# Patient Record
Sex: FEMALE
[De-identification: names, ages, dates, MRNs, and addresses within clinical notes are randomized per-mention and may not be internally consistent; named-entity substitution may affect disease eponyms.]

---

## 2018-04-02 ENCOUNTER — HOSPITAL ENCOUNTER (EMERGENCY)
Dept: HOSPITAL 92 - ERS | Age: 33
Discharge: LEFT BEFORE DISCHARGE | End: 2018-04-02
Payer: SELF-PAY

## 2018-04-02 DIAGNOSIS — Z53.21: Primary | ICD-10-CM

## 2018-04-02 LAB
ALBUMIN SERPL BCG-MCNC: 5 G/DL (ref 3.5–5)
ALP SERPL-CCNC: 61 U/L (ref 40–150)
ALT SERPL W P-5'-P-CCNC: 27 U/L (ref 8–55)
ANION GAP SERPL CALC-SCNC: 17 MMOL/L (ref 10–20)
AST SERPL-CCNC: 26 U/L (ref 5–34)
BASOPHILS # BLD AUTO: 0 THOU/UL (ref 0–0.2)
BASOPHILS NFR BLD AUTO: 0.2 % (ref 0–1)
BILIRUB SERPL-MCNC: 1 MG/DL (ref 0.2–1.2)
BUN SERPL-MCNC: 9 MG/DL (ref 7–18.7)
CALCIUM SERPL-MCNC: 10.3 MG/DL (ref 7.8–10.44)
CHLORIDE SERPL-SCNC: 104 MMOL/L (ref 98–107)
CO2 SERPL-SCNC: 21 MMOL/L (ref 22–29)
CREAT CL PREDICTED SERPL C-G-VRATE: 0 ML/MIN (ref 70–130)
EOSINOPHIL # BLD AUTO: 0 THOU/UL (ref 0–0.7)
EOSINOPHIL NFR BLD AUTO: 0.1 % (ref 0–10)
GLOBULIN SER CALC-MCNC: 3.3 G/DL (ref 2.4–3.5)
GLUCOSE SERPL-MCNC: 124 MG/DL (ref 70–105)
HGB BLD-MCNC: 13.9 G/DL (ref 12–16)
LIPASE SERPL-CCNC: 5 U/L (ref 8–78)
LYMPHOCYTES # BLD: 1.1 THOU/UL (ref 1.2–3.4)
LYMPHOCYTES NFR BLD AUTO: 15.5 % (ref 21–51)
MCH RBC QN AUTO: 30.9 PG (ref 27–31)
MCV RBC AUTO: 95.8 FL (ref 81–99)
MONOCYTES # BLD AUTO: 0.5 THOU/UL (ref 0.11–0.59)
MONOCYTES NFR BLD AUTO: 6.6 % (ref 0–10)
NEUTROPHILS # BLD AUTO: 5.4 THOU/UL (ref 1.4–6.5)
NEUTROPHILS NFR BLD AUTO: 77.6 % (ref 42–75)
PLATELET # BLD AUTO: 293 THOU/UL (ref 130–400)
POTASSIUM SERPL-SCNC: 3.7 MMOL/L (ref 3.5–5.1)
PREGS CONTROL BACKGROUND?: (no result)
PREGS CONTROL BAR APPEAR?: YES
RBC # BLD AUTO: 4.49 MILL/UL (ref 4.2–5.4)
SODIUM SERPL-SCNC: 138 MMOL/L (ref 136–145)
WBC # BLD AUTO: 6.9 THOU/UL (ref 4.8–10.8)

## 2018-04-02 PROCEDURE — 85025 COMPLETE CBC W/AUTO DIFF WBC: CPT

## 2018-04-02 PROCEDURE — 84703 CHORIONIC GONADOTROPIN ASSAY: CPT

## 2018-04-02 PROCEDURE — 36415 COLL VENOUS BLD VENIPUNCTURE: CPT

## 2018-04-02 PROCEDURE — 80053 COMPREHEN METABOLIC PANEL: CPT

## 2018-04-02 PROCEDURE — 83690 ASSAY OF LIPASE: CPT

## 2018-04-09 ENCOUNTER — HOSPITAL ENCOUNTER (EMERGENCY)
Dept: HOSPITAL 92 - ERS | Age: 33
Discharge: HOME | End: 2018-04-09
Payer: SELF-PAY

## 2018-04-09 DIAGNOSIS — R07.89: ICD-10-CM

## 2018-04-09 DIAGNOSIS — S29.011A: Primary | ICD-10-CM

## 2018-04-09 DIAGNOSIS — F41.9: ICD-10-CM

## 2018-04-09 LAB
ALBUMIN SERPL BCG-MCNC: 4.8 G/DL (ref 3.5–5)
ALP SERPL-CCNC: 51 U/L (ref 40–150)
ALT SERPL W P-5'-P-CCNC: 18 U/L (ref 8–55)
ANION GAP SERPL CALC-SCNC: 20 MMOL/L (ref 10–20)
AST SERPL-CCNC: 21 U/L (ref 5–34)
BASOPHILS # BLD AUTO: 0 THOU/UL (ref 0–0.2)
BASOPHILS NFR BLD AUTO: 0.4 % (ref 0–1)
BILIRUB SERPL-MCNC: 1 MG/DL (ref 0.2–1.2)
BUN SERPL-MCNC: 10 MG/DL (ref 7–18.7)
CALCIUM SERPL-MCNC: 9.7 MG/DL (ref 7.8–10.44)
CHLORIDE SERPL-SCNC: 101 MMOL/L (ref 98–107)
CK MB SERPL-MCNC: 1.4 NG/ML (ref 0–6.6)
CK SERPL-CCNC: 121 U/L (ref 29–168)
CO2 SERPL-SCNC: 19 MMOL/L (ref 22–29)
CREAT CL PREDICTED SERPL C-G-VRATE: 0 ML/MIN (ref 70–130)
EOSINOPHIL # BLD AUTO: 0 THOU/UL (ref 0–0.7)
EOSINOPHIL NFR BLD AUTO: 0.3 % (ref 0–10)
GLOBULIN SER CALC-MCNC: 3 G/DL (ref 2.4–3.5)
GLUCOSE SERPL-MCNC: 130 MG/DL (ref 70–105)
HGB BLD-MCNC: 14.6 G/DL (ref 12–16)
LYMPHOCYTES # BLD: 1.9 THOU/UL (ref 1.2–3.4)
LYMPHOCYTES NFR BLD AUTO: 18.1 % (ref 21–51)
MCH RBC QN AUTO: 31.6 PG (ref 27–31)
MCV RBC AUTO: 91.9 FL (ref 81–99)
MONOCYTES # BLD AUTO: 0.7 THOU/UL (ref 0.11–0.59)
MONOCYTES NFR BLD AUTO: 6.7 % (ref 0–10)
NEUTROPHILS # BLD AUTO: 7.8 THOU/UL (ref 1.4–6.5)
NEUTROPHILS NFR BLD AUTO: 74.5 % (ref 42–75)
PLATELET # BLD AUTO: 300 THOU/UL (ref 130–400)
POTASSIUM SERPL-SCNC: 3 MMOL/L (ref 3.5–5.1)
PREGS CONTROL BACKGROUND?: (no result)
PREGS CONTROL BAR APPEAR?: YES
RBC # BLD AUTO: 4.62 MILL/UL (ref 4.2–5.4)
SODIUM SERPL-SCNC: 137 MMOL/L (ref 136–145)
TROPONIN I SERPL DL<=0.01 NG/ML-MCNC: (no result) NG/ML (ref ?–0.03)
WBC # BLD AUTO: 10.5 THOU/UL (ref 4.8–10.8)

## 2018-04-09 PROCEDURE — 96374 THER/PROPH/DIAG INJ IV PUSH: CPT

## 2018-04-09 PROCEDURE — 82550 ASSAY OF CK (CPK): CPT

## 2018-04-09 PROCEDURE — 80053 COMPREHEN METABOLIC PANEL: CPT

## 2018-04-09 PROCEDURE — 93005 ELECTROCARDIOGRAM TRACING: CPT

## 2018-04-09 PROCEDURE — 85025 COMPLETE CBC W/AUTO DIFF WBC: CPT

## 2018-04-09 PROCEDURE — 85379 FIBRIN DEGRADATION QUANT: CPT

## 2018-04-09 PROCEDURE — 84703 CHORIONIC GONADOTROPIN ASSAY: CPT

## 2018-04-09 PROCEDURE — 84484 ASSAY OF TROPONIN QUANT: CPT

## 2018-04-09 PROCEDURE — 71045 X-RAY EXAM CHEST 1 VIEW: CPT

## 2018-04-09 PROCEDURE — 82553 CREATINE MB FRACTION: CPT

## 2018-04-09 NOTE — RAD
SINGLE VIEW CHEST:

 

Date:  04/09/18 

 

COMPARISON:  

None. 

 

HISTORY:  

Chest pain. 

 

FINDINGS:

Single view of the chest shows a normal sized cardiomediastinal silhouette. There is no evidence of c
onsolidation, mass, or pleural effusion. The bones are unremarkable. 

 

IMPRESSION: 

No evidence of acute cardiopulmonary disease.  

 

POS: [facility]

## 2018-05-18 NOTE — EKG
Test Reason : 

Blood Pressure : ***/*** mmHG

Vent. Rate : 081 BPM     Atrial Rate : 081 BPM

   P-R Int : 124 ms          QRS Dur : 060 ms

    QT Int : 374 ms       P-R-T Axes : -05 060 031 degrees

   QTc Int : 434 ms

 

Normal sinus rhythm

Normal ECG

 

Confirmed by CARIN VARGAS D.O. ([id number]),  MANE LOVE ([id number]) on 5/18/2018 3:13:40 PM

 

Referred By:  ALICIA           Confirmed By:CARIN VARGAS D.O.

## 2018-10-07 ENCOUNTER — HOSPITAL ENCOUNTER (EMERGENCY)
Dept: HOSPITAL 92 - ERS | Age: 33
LOS: 1 days | Discharge: HOME | End: 2018-10-08
Payer: SELF-PAY

## 2018-10-07 DIAGNOSIS — F41.9: ICD-10-CM

## 2018-10-07 DIAGNOSIS — E86.0: Primary | ICD-10-CM

## 2018-10-07 DIAGNOSIS — K29.00: ICD-10-CM

## 2018-10-07 LAB
ALBUMIN SERPL BCG-MCNC: 5.3 G/DL (ref 3.5–5)
ALP SERPL-CCNC: 55 U/L (ref 40–150)
ALT SERPL W P-5'-P-CCNC: 20 U/L (ref 8–55)
ANION GAP SERPL CALC-SCNC: 21 MMOL/L (ref 10–20)
AST SERPL-CCNC: 20 U/L (ref 5–34)
BASOPHILS # BLD AUTO: 0.1 THOU/UL (ref 0–0.2)
BASOPHILS NFR BLD AUTO: 0.6 % (ref 0–1)
BILIRUB SERPL-MCNC: 1.8 MG/DL (ref 0.2–1.2)
BUN SERPL-MCNC: 21 MG/DL (ref 7–18.7)
CALCIUM SERPL-MCNC: 10.8 MG/DL (ref 7.8–10.44)
CHLORIDE SERPL-SCNC: 98 MMOL/L (ref 98–107)
CK MB SERPL-MCNC: 0.7 NG/ML (ref 0–6.6)
CK SERPL-CCNC: 75 U/L (ref 29–168)
CO2 SERPL-SCNC: 19 MMOL/L (ref 22–29)
CREAT CL PREDICTED SERPL C-G-VRATE: 0 ML/MIN (ref 70–130)
EOSINOPHIL # BLD AUTO: 0.1 THOU/UL (ref 0–0.7)
EOSINOPHIL NFR BLD AUTO: 0.5 % (ref 0–10)
GLOBULIN SER CALC-MCNC: 3.8 G/DL (ref 2.4–3.5)
GLUCOSE SERPL-MCNC: 106 MG/DL (ref 70–105)
HGB BLD-MCNC: 15.9 G/DL (ref 12–16)
LYMPHOCYTES # BLD: 2.6 THOU/UL (ref 1.2–3.4)
LYMPHOCYTES NFR BLD AUTO: 23.7 % (ref 21–51)
MCH RBC QN AUTO: 30.9 PG (ref 27–31)
MCV RBC AUTO: 92.5 FL (ref 78–98)
MONOCYTES # BLD AUTO: 1.4 THOU/UL (ref 0.11–0.59)
MONOCYTES NFR BLD AUTO: 13.2 % (ref 0–10)
NEUTROPHILS # BLD AUTO: 6.7 THOU/UL (ref 1.4–6.5)
NEUTROPHILS NFR BLD AUTO: 62.1 % (ref 42–75)
PLATELET # BLD AUTO: 323 THOU/UL (ref 130–400)
POTASSIUM SERPL-SCNC: 3.1 MMOL/L (ref 3.5–5.1)
RBC # BLD AUTO: 5.16 MILL/UL (ref 4.2–5.4)
SODIUM SERPL-SCNC: 135 MMOL/L (ref 136–145)
TROPONIN I SERPL DL<=0.01 NG/ML-MCNC: (no result) NG/ML (ref ?–0.03)
WBC # BLD AUTO: 10.8 THOU/UL (ref 4.8–10.8)

## 2018-10-07 PROCEDURE — 96375 TX/PRO/DX INJ NEW DRUG ADDON: CPT

## 2018-10-07 PROCEDURE — 76705 ECHO EXAM OF ABDOMEN: CPT

## 2018-10-07 PROCEDURE — 82553 CREATINE MB FRACTION: CPT

## 2018-10-07 PROCEDURE — 71045 X-RAY EXAM CHEST 1 VIEW: CPT

## 2018-10-07 PROCEDURE — 96361 HYDRATE IV INFUSION ADD-ON: CPT

## 2018-10-07 PROCEDURE — 85025 COMPLETE CBC W/AUTO DIFF WBC: CPT

## 2018-10-07 PROCEDURE — 36415 COLL VENOUS BLD VENIPUNCTURE: CPT

## 2018-10-07 PROCEDURE — 80053 COMPREHEN METABOLIC PANEL: CPT

## 2018-10-07 PROCEDURE — 93005 ELECTROCARDIOGRAM TRACING: CPT

## 2018-10-07 PROCEDURE — 84484 ASSAY OF TROPONIN QUANT: CPT

## 2018-10-07 PROCEDURE — 96365 THER/PROPH/DIAG IV INF INIT: CPT

## 2018-10-07 NOTE — RAD
PORTABLE UPRIGHT FRONTAL CHEST RADIOGRAPH:

 

10/07/2018

 

HISTORY:

Chest pain.

 

COMPARISON:

04/09/2018

 

FINDINGS:

There is no pneumothorax or pleural fluid.  No focal consolidation or alveolar edema.  Heart and medi
astinal contour is unremarkable.

 

IMPRESSION:

No acute findings.

 

POS: [facility]

## 2018-10-08 NOTE — ULT
PRELIMINARY REPORT/[facility]/EMERGENTY AFTER-HOURS PROCEDURE 

 

US Abdomen Limited, Right Upper Quadrant

 

CLINICAL HISTORY:

33 years old, female; Pain; Other: Abd pain, t-bili 1.8

 

TECHNIQUE:

Real-time ultrasound of the right upper quadrant with image documentation.

 

COMPARISON:

No relevant prior studies available.

 

FINDINGS:

Moderate amount of biliary sludge within the gallbladder.

No definite cholelithiasis/shadowing gallstones.

No gallbladder wall thickening or pericholecystic fluid.

The gallbladder does not appear abnormally distended.

No biliary dilation, common duct measures 4.1 mm.

Unremarkable liver, no focal abnormality.

Visible pancreas unremarkable.

Images of the right kidney show no hydronephrosis.

 

IMPRESSION:

Moderate amount of biliary sludge within the gallbladder.

No definite cholelithiasis/shadowing gallstones.

No biliary tree dilation.

 

Thank you for allowing us to participate in the care of your patient.

Dictated and Authenticated by: Darwin Bianchi MD

10/08/2018 2:04 AM Central Time ([place] & [place])

 

 

FINAL REPORT 

 

GALLBLADDER ULTRASOUND:

 

FINDINGS: 

Abnormal echogenicity within the gallbladder suggests dense sludge.  No focal stone is identified.  

 

I am in agreement with the preliminary report.

 

POS: [facility]

## 2018-11-04 ENCOUNTER — HOSPITAL ENCOUNTER (EMERGENCY)
Dept: HOSPITAL 92 - ERS | Age: 33
Discharge: HOME | End: 2018-11-04
Payer: SELF-PAY

## 2018-11-04 DIAGNOSIS — F41.9: ICD-10-CM

## 2018-11-04 DIAGNOSIS — R10.13: Primary | ICD-10-CM

## 2018-11-04 DIAGNOSIS — Z79.899: ICD-10-CM

## 2018-11-04 DIAGNOSIS — R19.7: ICD-10-CM

## 2018-11-04 DIAGNOSIS — R11.2: ICD-10-CM

## 2018-11-04 LAB
ALBUMIN SERPL BCG-MCNC: 4.8 G/DL (ref 3.5–5)
ALBUMIN SERPL BCG-MCNC: 4.8 G/DL (ref 3.5–5)
ALP SERPL-CCNC: 50 U/L (ref 40–150)
ALP SERPL-CCNC: 50 U/L (ref 40–150)
ALT SERPL W P-5'-P-CCNC: 10 U/L (ref 8–55)
ALT SERPL W P-5'-P-CCNC: 12 U/L (ref 8–55)
ANION GAP SERPL CALC-SCNC: 14 MMOL/L (ref 10–20)
ANION GAP SERPL CALC-SCNC: 16 MMOL/L (ref 10–20)
AST SERPL-CCNC: 18 U/L (ref 5–34)
AST SERPL-CCNC: 21 U/L (ref 5–34)
BASOPHILS # BLD AUTO: 0.1 THOU/UL (ref 0–0.2)
BASOPHILS # BLD AUTO: 0.1 THOU/UL (ref 0–0.2)
BASOPHILS NFR BLD AUTO: 0.7 % (ref 0–1)
BASOPHILS NFR BLD AUTO: 1.6 % (ref 0–1)
BILIRUB SERPL-MCNC: 0.5 MG/DL (ref 0.2–1.2)
BILIRUB SERPL-MCNC: 0.6 MG/DL (ref 0.2–1.2)
BUN SERPL-MCNC: 5 MG/DL (ref 7–18.7)
BUN SERPL-MCNC: 6 MG/DL (ref 7–18.7)
CALCIUM SERPL-MCNC: 10 MG/DL (ref 7.8–10.44)
CALCIUM SERPL-MCNC: 10.1 MG/DL (ref 7.8–10.44)
CHLORIDE SERPL-SCNC: 106 MMOL/L (ref 98–107)
CHLORIDE SERPL-SCNC: 107 MMOL/L (ref 98–107)
CO2 SERPL-SCNC: 20 MMOL/L (ref 22–29)
CO2 SERPL-SCNC: 22 MMOL/L (ref 22–29)
CREAT CL PREDICTED SERPL C-G-VRATE: 0 ML/MIN (ref 70–130)
CREAT CL PREDICTED SERPL C-G-VRATE: 0 ML/MIN (ref 70–130)
EOSINOPHIL # BLD AUTO: 0 THOU/UL (ref 0–0.7)
EOSINOPHIL # BLD AUTO: 0.1 THOU/UL (ref 0–0.7)
EOSINOPHIL NFR BLD AUTO: 0.1 % (ref 0–10)
EOSINOPHIL NFR BLD AUTO: 0.9 % (ref 0–10)
GLOBULIN SER CALC-MCNC: 3.5 G/DL (ref 2.4–3.5)
GLOBULIN SER CALC-MCNC: 3.6 G/DL (ref 2.4–3.5)
GLUCOSE SERPL-MCNC: 107 MG/DL (ref 70–105)
GLUCOSE SERPL-MCNC: 118 MG/DL (ref 70–105)
HGB BLD-MCNC: 13.4 G/DL (ref 12–16)
HGB BLD-MCNC: 14 G/DL (ref 12–16)
LIPASE SERPL-CCNC: 9 U/L (ref 8–78)
LIPASE SERPL-CCNC: 9 U/L (ref 8–78)
LYMPHOCYTES # BLD: 1.8 THOU/UL (ref 1.2–3.4)
LYMPHOCYTES # BLD: 3 THOU/UL (ref 1.2–3.4)
LYMPHOCYTES NFR BLD AUTO: 21.3 % (ref 21–51)
LYMPHOCYTES NFR BLD AUTO: 32.8 % (ref 21–51)
MCH RBC QN AUTO: 31 PG (ref 27–31)
MCH RBC QN AUTO: 31.3 PG (ref 27–31)
MCV RBC AUTO: 94.4 FL (ref 78–98)
MCV RBC AUTO: 95 FL (ref 78–98)
MONOCYTES # BLD AUTO: 0.7 THOU/UL (ref 0.11–0.59)
MONOCYTES # BLD AUTO: 0.9 THOU/UL (ref 0.11–0.59)
MONOCYTES NFR BLD AUTO: 7.9 % (ref 0–10)
MONOCYTES NFR BLD AUTO: 9.5 % (ref 0–10)
NEUTROPHILS # BLD AUTO: 5 THOU/UL (ref 1.4–6.5)
NEUTROPHILS # BLD AUTO: 6 THOU/UL (ref 1.4–6.5)
NEUTROPHILS NFR BLD AUTO: 55.2 % (ref 42–75)
NEUTROPHILS NFR BLD AUTO: 70.1 % (ref 42–75)
PLATELET # BLD AUTO: 302 THOU/UL (ref 130–400)
PLATELET # BLD AUTO: 335 THOU/UL (ref 130–400)
POTASSIUM SERPL-SCNC: 3.4 MMOL/L (ref 3.5–5.1)
POTASSIUM SERPL-SCNC: 3.7 MMOL/L (ref 3.5–5.1)
PREGS CONTROL BACKGROUND?: (no result)
PREGS CONTROL BAR APPEAR?: YES
RBC # BLD AUTO: 4.34 MILL/UL (ref 4.2–5.4)
RBC # BLD AUTO: 4.46 MILL/UL (ref 4.2–5.4)
SODIUM SERPL-SCNC: 139 MMOL/L (ref 136–145)
SODIUM SERPL-SCNC: 139 MMOL/L (ref 136–145)
WBC # BLD AUTO: 8.5 THOU/UL (ref 4.8–10.8)
WBC # BLD AUTO: 9 THOU/UL (ref 4.8–10.8)

## 2018-11-04 PROCEDURE — 74177 CT ABD & PELVIS W/CONTRAST: CPT

## 2018-11-04 PROCEDURE — 84703 CHORIONIC GONADOTROPIN ASSAY: CPT

## 2018-11-04 PROCEDURE — 96365 THER/PROPH/DIAG IV INF INIT: CPT

## 2018-11-04 PROCEDURE — 96375 TX/PRO/DX INJ NEW DRUG ADDON: CPT

## 2018-11-04 PROCEDURE — C9113 INJ PANTOPRAZOLE SODIUM, VIA: HCPCS

## 2018-11-04 PROCEDURE — 96366 THER/PROPH/DIAG IV INF ADDON: CPT

## 2018-11-04 PROCEDURE — 85379 FIBRIN DEGRADATION QUANT: CPT

## 2018-11-04 PROCEDURE — A9558 XE133 XENON 10MCI: HCPCS

## 2018-11-04 PROCEDURE — A9540 TC99M MAA: HCPCS

## 2018-11-04 PROCEDURE — 71045 X-RAY EXAM CHEST 1 VIEW: CPT

## 2018-11-04 PROCEDURE — 93005 ELECTROCARDIOGRAM TRACING: CPT

## 2018-11-04 PROCEDURE — 83690 ASSAY OF LIPASE: CPT

## 2018-11-04 PROCEDURE — 78582 LUNG VENTILAT&PERFUS IMAGING: CPT

## 2018-11-04 PROCEDURE — 85025 COMPLETE CBC W/AUTO DIFF WBC: CPT

## 2018-11-04 PROCEDURE — 96361 HYDRATE IV INFUSION ADD-ON: CPT

## 2018-11-04 PROCEDURE — 96374 THER/PROPH/DIAG INJ IV PUSH: CPT

## 2018-11-04 PROCEDURE — 71046 X-RAY EXAM CHEST 2 VIEWS: CPT

## 2018-11-04 PROCEDURE — 80053 COMPREHEN METABOLIC PANEL: CPT

## 2018-11-04 NOTE — CT
CT ABDOME AND PELVIS WITH CONTRAST:

 

HISTORY:

Abdominal pain and vomiting.  Epigastric pain.

 

TECHNIQUE:

Contrast enhanced CT images of the abdomen and pelvis were obtained.  IV contrast was given.  Oral co
ntrast was not given, per referring physician.

 

FINDINGS:

The lung bases are unremarkable.

 

No evidence of free intraperitoneal air is seen.

 

The liver and spleen are unremarkable.  The gallbladder and pancreas are unremarkable.

 

The adrenal glands and kidneys are unremarkable.

 

No dilated loops of bowel seen.

 

A complex right ovarian lesion is seen, with fat, compatible with a right ovarian dermoid, unchanged 
since the previous comparison CT from May 2015.  The left ovary is also enlarged but not significantl
y changed since the previous exam.  The small bowel loops are unremarkable.  No dilated loops of irwin
l are seen.  No evidence of colonic obstruction is seen.

 

IMPRESSION:

1.  Nonvisualization of the appendix.

 

2.  Right adnexal area of hypodensity, most compatible with a right ovarian dermoid.

 

Findings not significantly changed since the previous comparison CT from May 2015.

 

POS: [facility]

## 2018-11-04 NOTE — RAD
PA AND LATERAL CHEST:

 

HISTORY:

Chest pain.

 

COMPARISON:

01/17/2017

 

FINDINGS:

The heart size and mediastinum are within normal limits.  The lungs are clear of infiltrates.  No bon
y findings.

 

IMPRESSION:

No active intrathoracic disease.

 

POS: [facility]

## 2018-11-04 NOTE — RAD
PORTABLE UPRIGHT CHEST 1 VIEW:

 

DATE: 11/4/2018.

TIME: 4:44 a.m.

 

HISTORY: 

Chest pain.

 

FINDINGS: 

Comparison is made with the exam of 10/7/2018.

 

The heart size is normal.  The lungs are well expanded without focal areas of consolidation, pneumoth
oraces, or pleural effusions.

 

IMPRESSION: 

No radiographic evidence of acute cardiopulmonary process.

 

POS: [facility]

## 2018-11-04 NOTE — NM
VENTILATION AND PERFUSION STUDY:

 

11/04/2018

 

HISTORY:

A 33-year-old with an elevated D-dimer.  Chest pain.

 

DOSE:

Xenon 133 10.7 millicuries for the ventilation portion of the exam.

Technetium 99m MAA 5.8 millicuries for the perfusion portion of the exam.

 

TECHNIQUE:

A ventilation/perfusion study was performed.

 

FINDINGS:

No evidence of areas of ventilation/perfusion mismatch seen.

 

IMPRESSION:

Low probability scan for pulmonary emboli.

 

POS: [facility]

## 2018-12-27 ENCOUNTER — HOSPITAL ENCOUNTER (EMERGENCY)
Dept: HOSPITAL 92 - ERS | Age: 33
Discharge: HOME | End: 2018-12-27
Payer: SELF-PAY

## 2018-12-27 DIAGNOSIS — E86.0: ICD-10-CM

## 2018-12-27 DIAGNOSIS — F41.9: ICD-10-CM

## 2018-12-27 DIAGNOSIS — K29.70: Primary | ICD-10-CM

## 2018-12-27 LAB
ALBUMIN SERPL BCG-MCNC: 5.1 G/DL (ref 3.5–5)
ALP SERPL-CCNC: 71 U/L (ref 40–150)
ALT SERPL W P-5'-P-CCNC: 12 U/L (ref 8–55)
ANION GAP SERPL CALC-SCNC: 14 MMOL/L (ref 10–20)
AST SERPL-CCNC: 22 U/L (ref 5–34)
BACTERIA UR QL AUTO: (no result) HPF
BASOPHILS # BLD AUTO: 0.1 THOU/UL (ref 0–0.2)
BASOPHILS NFR BLD AUTO: 0.7 % (ref 0–1)
BILIRUB SERPL-MCNC: 0.8 MG/DL (ref 0.2–1.2)
BUN SERPL-MCNC: 12 MG/DL (ref 7–18.7)
CALCIUM SERPL-MCNC: 10.2 MG/DL (ref 7.8–10.44)
CHLORIDE SERPL-SCNC: 104 MMOL/L (ref 98–107)
CO2 SERPL-SCNC: 24 MMOL/L (ref 22–29)
CREAT CL PREDICTED SERPL C-G-VRATE: 0 ML/MIN (ref 70–130)
CRYSTAL-AUWI FLAG: 0 (ref 0–15)
EOSINOPHIL # BLD AUTO: 0.2 THOU/UL (ref 0–0.7)
EOSINOPHIL NFR BLD AUTO: 1.8 % (ref 0–10)
GLOBULIN SER CALC-MCNC: 3.7 G/DL (ref 2.4–3.5)
GLUCOSE SERPL-MCNC: 107 MG/DL (ref 70–105)
HEV IGM SER QL: 6.8 (ref 0–7.99)
HGB BLD-MCNC: 14.5 G/DL (ref 12–16)
HYALINE CASTS #/AREA URNS LPF: (no result) LPF
LIPASE SERPL-CCNC: 16 U/L (ref 8–78)
LYMPHOCYTES # BLD: 2.7 THOU/UL (ref 1.2–3.4)
LYMPHOCYTES NFR BLD AUTO: 28 % (ref 21–51)
MCH RBC QN AUTO: 30.2 PG (ref 27–31)
MCV RBC AUTO: 92.1 FL (ref 78–98)
MONOCYTES # BLD AUTO: 0.7 THOU/UL (ref 0.11–0.59)
MONOCYTES NFR BLD AUTO: 7.1 % (ref 0–10)
NEUTROPHILS # BLD AUTO: 6 THOU/UL (ref 1.4–6.5)
NEUTROPHILS NFR BLD AUTO: 62.4 % (ref 42–75)
PATHC CAST-AUWI FLAG: 0.58 (ref 0–2.49)
PLATELET # BLD AUTO: 302 THOU/UL (ref 130–400)
POTASSIUM SERPL-SCNC: 3.7 MMOL/L (ref 3.5–5.1)
PREGS CONTROL BACKGROUND?: (no result)
PREGS CONTROL BAR APPEAR?: YES
RBC # BLD AUTO: 4.78 MILL/UL (ref 4.2–5.4)
RBC UR QL AUTO: (no result) HPF (ref 0–3)
SODIUM SERPL-SCNC: 138 MMOL/L (ref 136–145)
SP GR UR STRIP: 1.03 (ref 1–1.04)
SPERM-AUWI FLAG: 0 (ref 0–9.9)
WBC # BLD AUTO: 9.6 THOU/UL (ref 4.8–10.8)
YEAST-AUWI FLAG: 0 (ref 0–25)

## 2018-12-27 PROCEDURE — 96375 TX/PRO/DX INJ NEW DRUG ADDON: CPT

## 2018-12-27 PROCEDURE — 83690 ASSAY OF LIPASE: CPT

## 2018-12-27 PROCEDURE — 96361 HYDRATE IV INFUSION ADD-ON: CPT

## 2018-12-27 PROCEDURE — 96376 TX/PRO/DX INJ SAME DRUG ADON: CPT

## 2018-12-27 PROCEDURE — 36415 COLL VENOUS BLD VENIPUNCTURE: CPT

## 2018-12-27 PROCEDURE — 83605 ASSAY OF LACTIC ACID: CPT

## 2018-12-27 PROCEDURE — 84703 CHORIONIC GONADOTROPIN ASSAY: CPT

## 2018-12-27 PROCEDURE — 85025 COMPLETE CBC W/AUTO DIFF WBC: CPT

## 2018-12-27 PROCEDURE — 81015 MICROSCOPIC EXAM OF URINE: CPT

## 2018-12-27 PROCEDURE — 93005 ELECTROCARDIOGRAM TRACING: CPT

## 2018-12-27 PROCEDURE — C9113 INJ PANTOPRAZOLE SODIUM, VIA: HCPCS

## 2018-12-27 PROCEDURE — 81003 URINALYSIS AUTO W/O SCOPE: CPT

## 2018-12-27 PROCEDURE — 80053 COMPREHEN METABOLIC PANEL: CPT

## 2018-12-27 PROCEDURE — 74177 CT ABD & PELVIS W/CONTRAST: CPT

## 2018-12-27 PROCEDURE — 96374 THER/PROPH/DIAG INJ IV PUSH: CPT

## 2018-12-27 NOTE — CT
CT OF THE ABDOMEN AND PELVIS WITH CONTRAST:

12/27/18

 

COMPARISON:  

11/4/18.

 

HISTORY: 

Abdominal pain and bloody emesis.

 

TECHNIQUE: 

Multiple contiguous axial images were obtained in a CT  of the abdomen and pelvis with contrast. Stephon
nal reformats were performed. 

 

FINDINGS:  

The liver, gallbladder, kidneys, adrenal glands, spleen, and pancrease are unremarkable. No free air,
 free fluid, or stranding changes are seen in the abdomen or pelvis. 

 

The large and small bowel are unremarkable. The appendix is not definitely seen. There is a fat conta
ining lesion in the right ovary and this is most consistent with a fat containing mature cystic terat
nahomy of the right ovary. The left ovary and uterus are unremarkable. No abdominal or pelvic lymphadeno
segundo are seen. The osseous structures, visualized inferior thorax and abdominal wall soft tissues ar
e unremarkable. 

 

IMPRESSION:  

1.      No evidence of acute intra-abdominal/pelvic abnormality.

2.      Right ovarian mature cystic teratoma.

 

 

 

POS: [facility]

## 2018-12-28 NOTE — EKG
Test Reason : CHEST PAIN

Blood Pressure : ***/*** mmHG

Vent. Rate : 070 BPM     Atrial Rate : 070 BPM

   P-R Int : 158 ms          QRS Dur : 070 ms

    QT Int : 382 ms       P-R-T Axes : 048 072 030 degrees

   QTc Int : 412 ms

 

Normal sinus rhythm

Normal ECG

 

Confirmed by KIM SHORT ([id number]),  MANE LOVE ([id number]) on 12/28/2018 3:34:59 PM

 

Referred By:  HAN           Confirmed By:KIM SHORT

## 2018-12-29 ENCOUNTER — HOSPITAL ENCOUNTER (EMERGENCY)
Dept: HOSPITAL 92 - ERS | Age: 33
LOS: 1 days | Discharge: HOME | End: 2018-12-30
Payer: SELF-PAY

## 2018-12-29 DIAGNOSIS — F41.9: ICD-10-CM

## 2018-12-29 DIAGNOSIS — K31.89: ICD-10-CM

## 2018-12-29 DIAGNOSIS — Z79.891: ICD-10-CM

## 2018-12-29 DIAGNOSIS — R11.10: ICD-10-CM

## 2018-12-29 DIAGNOSIS — K29.70: Primary | ICD-10-CM

## 2018-12-29 DIAGNOSIS — Z79.899: ICD-10-CM

## 2018-12-29 LAB
ALBUMIN SERPL BCG-MCNC: 4.6 G/DL (ref 3.5–5)
ALP SERPL-CCNC: 58 U/L (ref 40–150)
ALT SERPL W P-5'-P-CCNC: 12 U/L (ref 8–55)
ANION GAP SERPL CALC-SCNC: 14 MMOL/L (ref 10–20)
AST SERPL-CCNC: 17 U/L (ref 5–34)
BACTERIA UR QL AUTO: (no result) HPF
BASOPHILS # BLD AUTO: 0.1 THOU/UL (ref 0–0.2)
BASOPHILS NFR BLD AUTO: 0.7 % (ref 0–1)
BILIRUB SERPL-MCNC: 0.6 MG/DL (ref 0.2–1.2)
BUN SERPL-MCNC: 8 MG/DL (ref 7–18.7)
CALCIUM SERPL-MCNC: 9.5 MG/DL (ref 7.8–10.44)
CHLORIDE SERPL-SCNC: 106 MMOL/L (ref 98–107)
CO2 SERPL-SCNC: 22 MMOL/L (ref 22–29)
CREAT CL PREDICTED SERPL C-G-VRATE: 0 ML/MIN (ref 70–130)
CRYSTAL-AUWI FLAG: 0.1 (ref 0–15)
CRYSTALS URNS QL MICRO: (no result) HPF
EOSINOPHIL # BLD AUTO: 0.3 THOU/UL (ref 0–0.7)
EOSINOPHIL NFR BLD AUTO: 3.2 % (ref 0–10)
GLOBULIN SER CALC-MCNC: 3.2 G/DL (ref 2.4–3.5)
GLUCOSE SERPL-MCNC: 103 MG/DL (ref 70–105)
HEV IGM SER QL: 18 (ref 0–7.99)
HGB BLD-MCNC: 13 G/DL (ref 12–16)
HYALINE CASTS #/AREA URNS LPF: (no result) LPF
LIPASE SERPL-CCNC: 16 U/L (ref 8–78)
LYMPHOCYTES # BLD: 3 THOU/UL (ref 1.2–3.4)
LYMPHOCYTES NFR BLD AUTO: 35.4 % (ref 21–51)
MCH RBC QN AUTO: 31.2 PG (ref 27–31)
MCV RBC AUTO: 91.1 FL (ref 78–98)
MONOCYTES # BLD AUTO: 0.8 THOU/UL (ref 0.11–0.59)
MONOCYTES NFR BLD AUTO: 9.1 % (ref 0–10)
NEUTROPHILS # BLD AUTO: 4.3 THOU/UL (ref 1.4–6.5)
NEUTROPHILS NFR BLD AUTO: 51.7 % (ref 42–75)
PATHC CAST-AUWI FLAG: 2.61 (ref 0–2.49)
PLATELET # BLD AUTO: 264 THOU/UL (ref 130–400)
POTASSIUM SERPL-SCNC: 3.3 MMOL/L (ref 3.5–5.1)
PREGU CONTROL BACKGROUND?: (no result)
PREGU CONTROL BAR APPEAR?: YES
PROT UR STRIP.AUTO-MCNC: 30 MG/DL
RBC # BLD AUTO: 4.17 MILL/UL (ref 4.2–5.4)
RBC UR QL AUTO: (no result) HPF (ref 0–3)
SODIUM SERPL-SCNC: 139 MMOL/L (ref 136–145)
SP GR UR STRIP: 1.04 (ref 1–1.04)
SPERM-AUWI FLAG: 0 (ref 0–9.9)
WBC # BLD AUTO: 8.3 THOU/UL (ref 4.8–10.8)
WBC UR QL AUTO: (no result) HPF (ref 0–3)
YEAST-AUWI FLAG: 0 (ref 0–25)

## 2018-12-29 PROCEDURE — 85025 COMPLETE CBC W/AUTO DIFF WBC: CPT

## 2018-12-29 PROCEDURE — 87660 TRICHOMONAS VAGIN DIR PROBE: CPT

## 2018-12-29 PROCEDURE — 81025 URINE PREGNANCY TEST: CPT

## 2018-12-29 PROCEDURE — 80053 COMPREHEN METABOLIC PANEL: CPT

## 2018-12-29 PROCEDURE — 96374 THER/PROPH/DIAG INJ IV PUSH: CPT

## 2018-12-29 PROCEDURE — 87480 CANDIDA DNA DIR PROBE: CPT

## 2018-12-29 PROCEDURE — 36415 COLL VENOUS BLD VENIPUNCTURE: CPT

## 2018-12-29 PROCEDURE — 96375 TX/PRO/DX INJ NEW DRUG ADDON: CPT

## 2018-12-29 PROCEDURE — 76856 US EXAM PELVIC COMPLETE: CPT

## 2018-12-29 PROCEDURE — 96361 HYDRATE IV INFUSION ADD-ON: CPT

## 2018-12-29 PROCEDURE — 74022 RADEX COMPL AQT ABD SERIES: CPT

## 2018-12-29 PROCEDURE — 96372 THER/PROPH/DIAG INJ SC/IM: CPT

## 2018-12-29 PROCEDURE — 81003 URINALYSIS AUTO W/O SCOPE: CPT

## 2018-12-29 PROCEDURE — 81015 MICROSCOPIC EXAM OF URINE: CPT

## 2018-12-29 PROCEDURE — 83690 ASSAY OF LIPASE: CPT

## 2018-12-29 PROCEDURE — 87510 GARDNER VAG DNA DIR PROBE: CPT

## 2018-12-29 NOTE — RAD
ONE VIEW CHEST

TWO VIEWS ABDOMEN

12/29/18

 

HISTORY: 

Pain.

 

FINDINGS:  

 

CHEST ONE VIEW:

Normal cardiac silhouette. The lungs and pleural spaces are clear. No pneumothorax or osseous abnorma
lities. 

 

TWO VIEWS ABDOMEN: 

Nonspecific bowel gas pattern. No pneumoperitoneum. No suspicious densities in the abdomen or pelvis.
 No differential air fluid levels.

 

IMPRESSION:  

1.      No acute cardiopulmonary process. 

2.      Nonspecific bowel gas pattern. 

 

POS: [facility]

## 2018-12-30 NOTE — CT
ABDOMEN CT WITH CONTRAST

PELVIC CT WITH CONTRAST:

 

Comparison: 12-27-18, 11-4-18

 

History: Abdominal pain, continued vomiting. 

 

FINDINGS: 

 

ABDOMEN CT: 

Lung base is clear. Normal heart size. Stable configuration of the aorta. Stable portal vein patency.
 Unremarkable gallbladder. 

 

Appropriate enhancement of the solid organs. 

 

Symmetric enhancement of the kidneys. No obstructive uropathy. 

 

No mesenteric mass, lymphadenopathy, free air or free fluid. 

 

No evidence of bowel obstruction. Ileocecal junction is normal. Appendix is surgically absent, nevert
heless, no inflammation of the cecal apex. Unremarkable colon. 

 

PELVIC CT:

Stable right ovarian dermoid. No acute abnormality in the pelvis. Trace amount of free fluid is noted
. No lymphadenopathy or free air. 

 

No lytic or blastic lesions in the osseous structures. 

 

IMPRESSION: 

No significant interval change. No acute abnormality in the abdomen or pelvis. Results of study discu
ssed with Dr. Larios 12-30-18 at 9:13 p.m.

 

POS: [facility]

## 2018-12-30 NOTE — ULT
PELVIC ULTRASOUND:

12/29/18

 

COMPARISON:  

5/11/15

 

HISTORY: 

Pelvic pain. Evaluate for ovarian torsion. 

 

TECHNIQUE:  

Transabdominal and endovaginal imaging of the pelvis is performed. Ovaries are interrogated with gray
 scale, color flow, doppler imaging with spectral waveform analysis.

 

FINDINGS:  

Uterus is identified measuring 5.2 cm in the transverse dimension. Sagittally, uterus measures 6.3 x 
3.4 cm. No myometrial masses. The visualized endometrium has 0.7 cm in diameter. Echotexture is homog
eneous. Left ovary has a normal echotexture measuring 4.1 x 2.2 x 2.5 cm. 

 

In the right adnexa, there is a echogenic focus with peripheral hypoechogenicity. Overall, this findi
ng measures 4.2 x 3.1 x 2.0 cm. The central hyperechoic focus measures 2.3 x 2.4 x 2.2 cm. 

 

There is no free fluid.

 

OVARIAN DOPPLER:

There is vascular flow to the left ovary. There does appear to be vascular flow to the mixed attenuat
ion mass in the right adnexa. This mixed attenuation lesion has been previously demonstrated to be a 
right ovarian dermoid. Findings are confirmed on a CT from 12/27/18. 

 

IMPRESSION:  

1.      Vascular flow to both ovaries. 

2.      Right ovarian dermoid.

 

POS: [facility]

## 2018-12-31 NOTE — PDOC.PN
- Subjective


Encounter Start Date: 12/31/18


Encounter Start Time: 12:48


Subjective: Complaining of abdominal discomfort and feeling feverish with 

chills. 


-: Unable to tolerate food x 3-4 days. Vomiting x 1 this morning due to pain.


-: Had pain in RUE with KCL infusion. IV changed to Lt UE. 





Reports having no bowel movements since 4 days ago. 


States it was normal without any diarrhea or constipation. 


Has flatus. Denies any abdominal distention.


No urinary symptoms. No dysuria or hematuria. 





Reports having a colonoscopy at age 12, unsure what was found.


She eventually underwent an appendectomy.


No previous upper endoscopy. 





- Objective


Resuscitation Status - Order Detail:





12/31/18 12:35


Resuscitation Status Routine 


   Resuscitation Status: FULL: Full Resuscitation


   Co-Sign Provider: 








Vital Signs & Weight: 


 Vital Signs (12 hours)











  Temp Pulse Resp BP Pulse Ox


 


 12/31/18 11:46  98.3 F  86  20  163/95 H  98


 


 12/31/18 07:40  98.0 F  91  16  153/91 H  98


 


 12/31/18 04:00  98.7 F  96  18  142/89 H  98








 Weight











Weight                         102 lb 1.6 oz














I&O: 


 











 12/30/18 12/31/18 01/01/19





 06:59 06:59 06:59


 


Intake Total  959 100


 


Output Total  1400 600


 


Balance  -441 -500











Result Diagrams: 


 12/31/18 05:06





 12/31/18 05:06





Phys Exam





- Physical Examination


Appears generally unwell and in discomfort


HEENT: PERRLA, oral pharynx no lesions


Neck: supple, full ROM


Respiratory: clear to auscultation bilateral


Cardiovascular: RRR


Gastrointestinal: soft, no distention, positive bowel sounds


tender to light palpation worse on the left abdomen


no rigidity


Musculoskeletal: no edema


Neurological: non-focal, normal sensation, moves all 4 limbs


Psychiatric: normal affect, A&O x 3


Skin: no rash





Dx/Plan





- Plan


cont current plan of care


Continue NPO


-: Awaiting GI


-: [employer] ordered. 





* .

## 2019-01-01 NOTE — CON
DATE OF CONSULTATION:  2018



CHIEF COMPLAINT:  Nausea, vomiting, and abdominal pain.



HISTORY OF PRESENT ILLNESS:  Ms. Malagon is a 33-year-old woman who started having

intermittent episodes of nausea, vomiting, and epigastric to left upper quadrant

abdominal pain around 6 months ago.  She went through a difficult time earlier in

the year when her grandfather  and the relationship 12 years ended.  She drank a

lot of alcohol following that and increased use of marijuana.  She went to the

emergency room multiple times with nausea, vomiting, and abdominal pain.  These

episodes will go on for up to 3 or 4 weeks and then she will go month without nausea

and vomiting, and then the symptoms can recur.  Through various trips to the ER, she

has had 3 CT scans of the abdomen and pelvis over the last couple of months.  She

has had abdominal ultrasound back in October that was negative for gallstones, some

biliary sludge was noted.  She had a pelvic and transvaginal ultrasound to view a

dermoid cyst in the right ovary or right adnexa.  This was also noted by CT back

from .  The nausea and vomiting well immediately go away when she takes a hot

shower.  She has had also some constipation during the vomiting episodes and has to

strain with bowel movements and can go a couple of days without bowel movements. 



PAST MEDICAL HISTORY:  Otherwise negative.



PAST SURGICAL HISTORY:  Appendectomy.  She did have an endoanal ultrasound when she

was 12 for evaluation of menstrual symptoms and abdominal pain at age 12, associated

with onset of menses. 



FAMILY HISTORY:  Negative for GI malignancy or inflammatory bowel disease.



SOCIAL HISTORY:  She states that she quit drinking, but drank very heavily for

several months earlier in the year.  She last used marijuana a week ago.  She quit

smoking cigarettes recently as well.  She states that at one point in the emergency

room, she was actually advised to go ahead and smoke marijuana to help control her

nausea and vomiting and improve her appetite. 



ALLERGIES:  PENICILLIN.



MEDICATIONS:  Prior to admission, none regularly.  She did take a couple of month

long courses of Prilosec 20 mg daily, which did not help.  She has taken Reglan a

few times without help.  She has taken Kaopectate without help.  She has taken

Tylenol with codeine without help.  She has taken Motrin 800 mg a couple of times

without help; however, she has not taken NSAIDs regularly. 



REVIEW OF SYSTEMS:  Negative x10 systems reviewed except as stated in the history of

present illness.  She does not get headaches with the nausea and vomiting symptoms. 



PHYSICAL EXAMINATION:

VITAL SIGNS:  Temperature 98.2, pulse 94, and blood pressure 109/62. 

GENERAL:  She is in no acute distress.  Alert and oriented x3. 

HEENT:  Eyes have no scleral icterus.  Oropharynx is clear without lesions.  No

cervical or supraclavicular lymphadenopathy. 

LUNGS:  Clear to auscultation bilaterally. 

HEART:  Regular rate and rhythm without murmur. 

ABDOMEN:  Soft, nontender, and nondistended.  Bowel sounds are present. 

EXTREMITIES:  No lower extremity edema.  Cranial nerves are grossly intact.



LABORATORY DATA:  White blood cell count is 8.3, hemoglobin 12.2, and platelets 210.

 Creatinine 0.64.  Bilirubin 0.6, AST 25, ALT 14, alkaline phosphatase 52, lipase 7.

 Serum pregnancy test was negative.  Cannabinoids are positive.  Opioids positive. 



IMPRESSION:  Cannabis hyperemesis syndrome.  She has very typical symptoms and

history to reinforce this.  We will plan upper endoscopy to rule out peptic ulcer;

however, given the classic history and story, even if she had an ulcer, most likely

cause of her symptoms is still the cannabis hyperemesis.  She does not take regular

NSAIDs.  Her ultrasound was negative for stones.  CT scan of the abdomen and pelvis

x3 in the last couple months has been negative. 



RECOMMENDATIONS:  

1. EGD tomorrow.

2. Cessation of marijuana.  She understands that this is the source for her symptoms 

and is willing to stop, but also acknowledges that might take a few months of being

off marijuana before her nausea and vomiting completely resolve.  We will keep the

biliary sludge in mind.  However, her pain is more in the epigastric to left upper

quadrant area when it does occur. 







Job ID:  [id number]

## 2019-01-01 NOTE — OP
DATE OF PROCEDURE:  01/01/2019



PROCEDURE PERFORMED:  Esophagogastroduodenoscopy with biopsy.



PREOPERATIVE DIAGNOSES:  Chronic nausea and vomiting and epigastric pain.



DESCRIPTION OF PROCEDURE:  Informed consent was obtained from the patient.  She was

sedated with total intravenous anesthesia.  The bite block was placed and the

endoscope was advanced easily to the second portion of the duodenum, and

retroflexion was performed in the stomach.  The esophagus was normal.  The GE

junction was normal.  The stomach had mild erythematous gastritis in the antrum and

fundus.  Biopsies were obtained from the antrum and body to rule out H pylori.  The

pylorus and first and second portions of the duodenum were normal.  Random biopsies

were taken to rule out celiac disease. 



IMPRESSION:  

1. Minimal erythematous gastritis, biopsied to rule out Helicobacter pylori.

2. Otherwise normal EGD.  Duodenal biopsies taken to rule out celiac disease.

3. Cannabis hyperemesis syndrome.  She did not tolerate clear liquids well yesterday.



RECOMMENDATIONS:  

1. Await histopathology.

2. We will try to advance to clear liquids again today and then advance as

tolerated.  She can discharge home when she is tolerating adequate oral intake. 

3. Complete cessation of marijuana use.







Job ID:  [id number]

## 2019-01-03 NOTE — DIS
DATE OF ADMISSION:  12/30/2018



DATE OF DISCHARGE:  01/02/2019



CONSULTANTS:  Dr. Goel, GI.



PRIMARY CARE PHYSICIAN:  None.



CODE STATUS:  Full.



PROCEDURES:  

1. The patient had abdominal pelvic CT, which showed no significant interval change.

 No acute abnormality in abdomen and pelvis.  The patient also underwent an EGD with

the impression of minimal gastritis.  Biopsy to rule out H pylori. 

2. Otherwise, normal EGD, duodenal biopsy taken to rule out celiac disease.

3. Cannabis hyperemesis syndrome.



DISCHARGE DIAGNOSES:  

1. Cannabis hyperemesis syndrome.

2. Hypokalemia.



REVIEW OF SYSTEMS:  The patient was examined on the day of discharge.  The patient

complained of mild epigastric tenderness on palpation.  Reports able to keep clear

fluids down.  Denied headache, chest pain, shortness of breath.  Some nausea.

Reported vomiting yesterday.  Denied any diarrhea.  All other systems were reviewed

and negative unless mentioned in the hospital course. 



PHYSICAL EXAMINATION:

VITAL SIGNS:  Temperature 98.6, pulse 98, respirations 14, pulse ox is 98% on room

air, and blood pressure 140/95. 

GENERAL:  The patient is alert and oriented to person, place, and time. 

HEENT:  Head is atraumatic and normocephalic.  Eyes; eyelids are normal to

inspection.  Pupils are equally round and reactive to light.  Extraocular muscles

are intact.  ENT; mouth exam is normal.  Mucous membranes are moist. 

NECK:  Trachea is midline.  Normal range of motion. 

RESPIRATORY/CHEST:  Breath sounds are clear.  Chest expansion is equal. 

CARDIOVASCULAR:  Regular rate and rhythm.  Heart sounds are normal. 

ABDOMEN:  Mild epigastric tenderness on palpation.  Bowel sounds are heard. 

BACK:  Normal inspection.  Normal range of motion. 

EXTREMITIES:  Upper extremities, normal inspection, normal range of motion.

Sensation is intact.  Radial pulses equal bilaterally.  Lower extremity, normal

inspection, normal range of motion.  Motor strength is normal.  Sensation intact.

Pedal pulses are normal. 

NEUROLOGIC:  The patient is oriented to person, place, and time.  Speech is normal.

Cranial nerves 2 through 12 are grossly intact.  No focal or motor deficits are

noted. 

SKIN:  Warm, dry, normal in color.



HOSPITAL COURSE:  The patient was seen and evaluated on 12/30/2018 for abdominal

pain, intractable nausea, and vomiting.  The patient's visit on 12/30 was the third

visit in a week for similar symptoms.  No clear cause was identified in the

emergency room.  Abdomen and pelvis CT was negative.  Multiple medications were

given.  The patient continued to vomit, so it was decided the patient was admitted

to the observation unit for further evaluation.  GI was consulted.  The patient

underwent EGD.  Findings discussed above.  Patient's labs, the patient was

persistently hypokalemic but patient refused any oral potassium.  Toxicology was

positive for opiates and cannabinoids.  The patient's labs and vital signs remained

stable.  The patient essentially last night was able to tolerate p.o. fluids without

vomiting.  The patient did drink more fluids this morning, did eat a small amount of

breakfast, was able to tolerate it.  The patient was discharged home.  Instructed to

follow up with [facility] within 1 week.  Follow up with Dr. Goel in the next 2

to 3 weeks. 



MEDICATIONS:  Home medications to be continued.  The patient continues:

1. Benadryl 25 mg p.o. q.8 hours as needed.

2. Motrin 800 mg p.o. t.i.d.

3. Reglan 10 mg p.o. q.8 hours as needed for nausea.

4. Zofran 4 mg sublingual q.6 hours p.r.n.

5. We added Protonix 40 mg p.o. daily.

6. K-Dur 20 mEq p.o. b.i.d.

7. We stopped the Prilosec that she had previously been on.



ALLERGIES:  PENICILLINS.



CONDITION:  The patient is in stable condition.



DISPOSITION:  The patient was discharged home.



REFERRAL:  The patient should follow up [facility] or establish with her

primary care physician within the next week.  Follow up with Dr. Goel as needed in

the next 2 to 3 weeks. 







Job ID:  [id number]

## 2019-01-14 NOTE — HP
CHIEF COMPLAINT:  Abdominal pain.



HISTORY OF PRESENT ILLNESS:  This is a 33-year-old female with past medical history

significant for gastritis, presenting with abdominal pain, which has been ongoing

for the past couple of days prior to the day of admission.  The patient was actually

recently hospitalized for similar episode of abdominal pain and during that time, GI

was consulted and EGD was performed, which was negative for any acute pathology

except for mild gastritis, which was noted.  The patient then had a biopsy taken and

the patient was asked to follow for results in two weeks.  Now, the patient is

coming in and complaining of ongoing worsening abdominal pain.  Per records, the

patient smokes cannabis and the patient was actually diagnosed with cannabis

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  At this point, the patient endorses

having abdominal pain; however, the patient denies any constipation, diarrhea,

hematochezia, melena, hematuria, dysuria, shortness of breath, and fevers. 



REVIEW OF SYSTEMS:  Positive for abdominal pain and chest discomfort that radiates

to the arm.  Also, the patient endorses nausea and vomiting, otherwise as documented

in the HPI.  All other systems are reviewed and are negative. 



PAST MEDICAL HISTORY:  Gastritis.



FAMILY HISTORY:  Reviewed and noncontributory to this visit.



PAST SURGICAL HISTORY:  Appendectomy.



PSYCHIATRIC HISTORY:  The patient has psych history of anxiety.



SOCIAL HISTORY:  The patient lives at home with family.  The patient smokes

marijuana.  Denies any illicit drug use such as cocaine, meth, but endorses

marijuana smoking and the patient denies any history of smoking cigarettes. 



ALLERGIES:  THE PATIENT IS ALLERGIC TO PENICILLIN.



CURRENT MEDICATIONS:  The patient takes;

1. Motrin 800 mg.

2. Prilosec.

3. Tylenol with codeine No.3.

4. Zofran.

5. Benadryl.

6. Reglan.



PHYSICAL EXAMINATION:

VITAL SIGNS:  Blood pressure is 133/98, pulse of 120, respiratory rate of 20,

temperature of 98.8, and O2 saturation of 98. 

GENERAL:  The patient is awake, alert, and oriented x3.  The patient is lying in bed

comfortably.  The patient is mildly in distress due to pain. 

HEENT:  Normocephalic and atraumatic.  Pupils are equally round and reactive to

light.  Extraocular movements are intact.  No scleral icterus.  No conjunctival

pallor.  Mucous membranes are moist. 

NECK:  Trachea is midline.  Full range of motion.  No JVD is appreciated.  Supple. 

LUNGS:  Clear to auscultation bilaterally.  No wheezing, no rales, no rhonchi

appreciated. 

CARDIAC:  Positive S1 and S2.  The patient is tachycardic. 

ABDOMEN:  Soft.  Mild tenderness with palpation.  No rigidity.  No guarding.

Positive bowel sounds in all quadrants.  No peritoneal signs appreciated. 

EXTREMITIES:  5/5 upper extremity strength with good pulses bilaterally at the upper

extremity.  5/5 lower extremity strength.  No edema noted.  Good pulses at the

dorsalis pedis. 

NEUROLOGIC:  Cranial nerves 2 through 12 grossly intact.  No neurologic deficits

noted. 

SKIN:  Warm, dry, and intact. 

PSYCHIATRIC:  Normal affect.



LABORATORY DATA:  WBC 7.6, hemoglobin is 13.1, hematocrit is 39.3, and platelet

count is 246.  D-dimer less than 0.27.  Sodium is 141, potassium is 3.3, chloride is

107, carbon dioxide of 21, anion gap of 15, BUN is 8, creatinine is 0.74, and

glucose 127.  AST is 21, ALT is 12. 



Urinalysis negative for nitrites and negative for leukocyte esterases. 



Toxicology positive for opioids and positive for cannabinoids.



ASSESSMENT AND PLAN:  This is a 33-year-old female being admitted for;

1. Abdominal pain likely due to gastritis.  The patient had

esophagogastroduodenoscopy done in the past, which showed mild gastritis on

pathology.  At this point, we have consulted GI.  We will follow up with their

recommendations.  We will continue the patient on IV fluids and we will continue

supportive care. 

2. History of gastroesophageal reflux disease.  We will continue the patient on

proton pump inhibitors. 

3. Deep venous thrombosis and gastrointestinal prophylaxis addressed.







Job ID:  [id number]

## 2020-04-23 NOTE — CT
CT abdomen and pelvis with IV contrast



HISTORY: Abdomen pain.



COMPARISON: 12/30/2018.



FINDINGS: Lung bases are clear. The liver, spleen, kidneys, adrenal glands, and pancreas have a radha
l CT appearance. Nonenlarged, nonspecific lymph nodes throughout the retroperitoneum.



Fat-containing mass/dermoid of the right adnexa is stable, measuring up to 3.7 cm greatest AP diamete
r on the current study. Follicles of the left ovary again demonstrated. Uterus is unremarkable.



No evidence of bowel obstruction or inflammation. Appendix not well delineated. Possibly surgically a
bsent.



  



IMPRESSION :

No acute abnormalities are demonstrated.



Right adnexal dermoid is stable.



Reported By: MIRIAM Hernandez 

Electronically Signed:  4/23/2020 2:00 PM

## 2020-04-23 NOTE — HP
CHIEF COMPLAINT:  Back pain and fever.



HISTORY OF PRESENT ILLNESS:  The patient is a 35-year-old female with past history

of anxiety and recent salpingo-oophorectomy for ectopic pregnancy in February of 2020.  She presented to the hospital today with 3 days of fever, chills, generalized

pain, but mostly localized to her left flank and left side of her chest.  The

patient has also been having persistent nausea and vomiting in addition to malaise.

In the ER, the patient was found to be tachycardic and her laboratory studies

revealed elevated WBC count, mild anion gap acidosis, and her UA was positive for

UTI. 



REVIEW OF SYSTEMS:  Negative except as noted in HPI.



PAST MEDICAL HISTORY:  Anxiety.



PAST SURGICAL HISTORY:  Salpingo-oophorectomy and appendectomy.



SOCIAL HISTORY:  The patient endorses cigarette smoking and social alcohol intake.

She denies illicit drug use. 



PHYSICAL EXAMINATION:

GENERAL:  The patient is alert and oriented and appears to be in distress from pain. 

HEENT:  Head is normocephalic and atraumatic.  Extraocular muscles are intact. 

NECK:  Supple. 

CHEST:  Clear to auscultation bilaterally. 

CARDIAC:  Revealed normal S1 and S2.  Mild tachycardia with no murmurs, rubs, or

gallops. 

ABDOMEN:  Soft with left costovertebral angle tenderness. 

NEUROLOGIC:  Unremarkable.



ASSESSMENT:  

1. Sepsis.

2. Pyelonephritis.

3. Dehydration.

4. Nausea and vomiting.

5. Mild anion gap metabolic acidosis.



PLAN:  Admit to telemetry due to slightly elevated troponin level.  Start IV

levofloxacin and IV normal saline, starting with a bolus.  Obtain urine and blood

cultures.  Troponin elevation is likely due to demand.  I highly doubt any coronary

artery disease in this patient without risk factor at her age.  Repeat CBC and BMP

in the a.m.  I will replace her potassium. 







Job ID:  [id number]

## 2020-04-23 NOTE — RAD
PORTABLE CHEST ONE VIEW:

4/23/20 at 3:31 p.m.

 

HISTORY: 

Chest pain. Nausea and vomiting, abdominal pain. 

 

COMPARISON: 

11/4/18.

 

FINDINGS: 

The cardiomediastinum is normal. The lungs are expanded and clear. The bony thorax is normal.

 

IMPRESSION: 

Normal exam. 

 

POS: MZA

## 2020-04-24 NOTE — PDOC.HOSPP
- Subjective


Encounter Date: 04/24/20


Subjective: 


Still complaining of left flank pain.


No nausea or vomiting today.





- Objective


Vital Signs & Weight: 


 Vital Signs (12 hours)











  Temp Pulse Resp BP Pulse Ox


 


 04/24/20 11:28  98.5 F  80  18  138/89  100


 


 04/24/20 08:06  98.6 F  85  18  144/99 H  100


 


 04/24/20 03:35  98.3 F  94  18  140/93 H  96








 Weight











Admit Weight                   115 lb 8.356 oz


 


Weight                         115 lb 8.356 oz














I&O: 


 











 04/23/20 04/24/20 04/25/20





 06:59 06:59 06:59


 


Intake Total  2390 


 


Output Total  400 


 


Balance  1990 











Result Diagrams: 


 04/24/20 04:12





 04/24/20 04:12





Hospitalist ROS





- Medication


Medications: 


Active Medications











Generic Name Dose Route Start Last Admin





  Trade Name Freq  PRN Reason Stop Dose Admin


 


Enoxaparin Sodium  40 mg  04/24/20 09:00  04/24/20 08:11





  Lovenox  SC   40 mg





  0900 MIKE   Administration





     





     





     





     


 


Sodium Chloride  1,000 mls @ 150 mls/hr  04/23/20 17:45  04/24/20 13:34





  Normal Saline 0.9%  IV   1,000 mls





  .Q6H40M MIKE   Administration





     





     





     





     


 


Levofloxacin 750 mg/ Device  150 mls @ 100 mls/hr  04/23/20 18:00  04/23/20 22:

17





  IVPB   150 mls





  Q24HR MIKE   Administration





     





     





     





     


 


Morphine Sulfate  2 mg  04/23/20 17:45  04/24/20 13:33





  Morphine  SLOW IVP   2 mg





  Q4H PRN   Administration





  Pain   





     





     





     


 


Ondansetron HCl  4 mg  04/23/20 17:45  04/24/20 13:33





  Zofran  IVP   4 mg





  Q6H PRN   Administration





  Nausea/Vomiting   





     





     





     














- Exam


General Appearance: awake alert


Neck: supple, no JVD


Heart: RRR


Respiratory: normal chest expansion, no tachypnea


Gastrointestinal: soft, non-distended, normal bowel sounds, tender to palpation


Neurological: cranial nerve grossly intact, no focal deficits





Hosp A/P


(1) Sepsis


Code(s): A41.9 - SEPSIS, UNSPECIFIED ORGANISM   Status: Acute   





(2) Pyelonephritis


Code(s): N12 - TUBULO-INTERSTITIAL NEPHRITIS, NOT SPCF AS ACUTE OR CHRONIC   

Status: Acute   





(3) High anion gap metabolic acidosis


Code(s): E87.2 - ACIDOSIS   Status: Acute   





(4) Hypokalemia


Code(s): E87.6 - HYPOKALEMIA   Status: Acute   





(5) Nausea and vomiting


Code(s): R11.2 - NAUSEA WITH VOMITING, UNSPECIFIED   Status: Acute   





- Plan


Urine culture positive for E coli.


Leukocytosis improved.


Continue IV levofloxacin & IVF.

## 2020-04-25 NOTE — ULT
EXAM: US Abdominal



CLINICAL HISTORY: Right upper quadrant pain. Left upper quadrant pain. Evaluate for cholelithiasis an
d cholecystitis.



COMPARISON: None.                  



FINDINGS:

Pancreas:  The head and proximal pancreatic body have a normal echotexture.



IVC: Visualized IVC has a normal caliber.



Aorta: Visualized aorta has a normal caliber.



Liver:Normal hepatic parenchymal echotexture. No hepatic masses or intrahepatic biliary dilatation. T
he contour of the hepatic margin is maintained. Right hepatic lobe measures 12.7 cm



Gallbladder: No sonographic evidence of cholelithiasis, gallbladder wall thickening or pericholecysti
c fluid

Riggs's sign:Negative



CBD: 0.2 cm common bile duct diameter



Portal vein: Patent.  Appropriate directional flow. 



Right kidney: Normal cortical echotexture.  No hydronephrosis. Right kidney measuring 4.0 x 9.7 x 4.6
 cm in length.



Left kidney: Normal cortical echotexture. No hydronephrosis

. Left kidney measuring 5.9 x 9.8 x 4.7 cm in length



Spleen: Normal echotexture, measuring 10 cm in maximum dimension



IMPRESSION:



No sonographic evidence of cholelithiasis or cholecystitis



Reported By: Stephani Anderson 

Electronically Signed:  4/25/2020 4:53 PM

## 2020-04-27 NOTE — DIS
DATE OF ADMISSION:  04/23/2020



DATE OF DISCHARGE:  04/25/2020



DISCHARGE DIAGNOSES:  

1. Sepsis secondary to acute pyelonephritis

2. Hyponatremia

3. Hypokalemia

4. Elevated troponin

5. Leukocytosis

6. Cocaine abuse

7. Marijuana abuse. 



CONSULTATIONS:  None.



PROCEDURES:  None.



BRIEF HISTORY OF PRESENT ILLNESS:  This is a 35-year-old female with past 
medical

history of anxiety, who presented to the emergency room with fevers, chills, and

left-sided flank pain.  The patient reported pain in the left side of her flank 
that

had gotten progressively worse over the last few days.  She also had some 
nausea and

vomiting.  She does have a history of an ectopic pregnancy in the past; however
, her

pain felt different to this.  The patient did have a UA, which was positive for 
UTI.

 She was given empiric antibiotics, and admitted for further workup. 



HOSPITAL COURSE: 

 Sepsis secondary to pyelonephritis:  The patient was started on

Levaquin empirically.  Her urine culture was positive for E coli, which was

pansensitive.  Blood cultures were normal.  The patient clinically improved, 
and her

left flank pain had resolved; however, she did start developing some left upper

quadrant and right upper quadrant pain on 04/25.  An ultrasound of her abdomen 
was

done, which showed no evidence of cholecystitis.  Her abdominal pain resolved 
after

receiving IV Protonix and eating food.  On discharge, she was prescribed

Levaquin to complete a 7-day course of antibiotics, and she was advised to 
resume

her home omeprazole for gastritis. 



Elevated troponin:  The patient had a troponin I of 0.095, which downtrended to

0.056.  CK-MB was normal.  She did not have any signs of chest pain.  She was

advised to get an echocardiogram done as an outpatient and to come to the 
hospital

if she has any chest pain.  She was prescribed aspirin on discharge.  Her UTOX 
did

test positive for cocaine and marijuana, and she needs to stop both of these

substances. 



Gastritis:  The patient had some left upper quadrant tenderness on the day of

discharge.  Ultrasound for abdomen was unremarkable.  She was started on IV 
Protonix

with improvement.  She was advised to resume her omeprazole on discharge. 



DISCHARGE PHYSICAL EXAMINATION: 

 VITAL SIGNS:  Temperature 98.6, heart rate 88,

respiratory rate 18, O2 saturation 99% on room air, and blood pressure 118/76. 

GENERAL:  The patient is alert, awake, oriented x3. 

CV:  Regular rate and rhythm with no murmurs, rubs, or gallops. 

LUNGS:  Clear to auscultation bilaterally. 

ABDOMEN:  Positive bowel sounds, mild right upper quadrant and left upper 
quadrant

tenderness, which later resolved.  No rebound, guarding, or rigidity.  No CVA

tenderness. 

EXTREMITIES:  No edema.



PERTINENT LABORATORY DATA:  

CBC on 04/25:  Unremarkable. 

CMP on 04/25:  Sodium 134, potassium 3.2. 

LFTs on 4/23:  AST 39, ALT 35, alkaline phosphatase 71. 

Troponin I:  0.095, 0.084, 0.056. 

CK-MB:  2.9. 

CK:  142. 

Lipase:  13. 

UA:  Extra turbid urine with 300 protein, 80 ketones, 2+ blood, 1+ nitrite, 
urine

leukocyte esterase 250, urine RBC 4 to 6. 

UTOX:  Positive TCA, cocaine, cannabinoid. 

CT abdomen on 04/23:  No acute abnormalities.  Right adnexal dermoid is stable. 

Chest x-ray on 4/23:  Normal exam. 

Abdominal ultrasound on 04/25:  No cholelithiasis or cholecystitis.



DISCHARGE CONDITION:  Stable.



ACTIVITY:  As tolerated.



DIET:  Heart healthy diet.



DISCHARGE MEDICATIONS:  

1. Levaquin 750 mg p.o. daily.

2. Aspirin 81 mg p.o. daily.



DISCHARGE INSTRUCTIONS:  The patient should follow up with her PCP in a week.  
She

should continue antibiotics for another 4 more days.  Resume her omeprazole.  
Get an

outpatient echo.  She should have her potassium levels repeated in a week.  She 
take

aspirin until she sees her PCP or cardiologist. 







Job ID:  [id number]



[facility]

## 2020-05-28 NOTE — RAD
EXAM:

CHEST ONE VIEW:

5/28/20

 

HISTORY: 

Chest pain. 

 

COMPARISON: 

4/23/20. 

 

FINDINGS: 

Heart size is normal. The lungs are clear. No confluent pneumonia, overt edema, or pleural effusion. 


 

IMPRESSION: 

No significant acute intrathoracic disease. 

 

POS: RRE

## 2020-05-28 NOTE — CT
CTA Angio Chest W WO Con



5/28/2020 10:04 AM



Indication:  35-year-old female with chest pain, shortness of breath and tachycardia



Technique:  Multiple CTA images were obtained of the thorax with IV contrast. 3-D rendering: MIP maury
nstructed images were created and reviewed.



Comparison:  No relevant prior studies available.



Findings:



Pulmonary arteries:  No central or segmental pulmonary embolus is evident.



Heart and Aorta:  Normal appearing.



Mediastinum:Normal appearing.  No enlarged lymph nodes.



Lungs:The lungs are clear.



Pleural space:  Clear.



Upper Abdomen:  No acute abnormality.



Osseous Structures:  No acute osseous abnormality.



Soft tissues:No abnormality.



Other findings:None.



Impression:

No central or segmental pulmonary embolus.





Reported By: Mateo Mary 

Electronically Signed:  5/28/2020 11:20 AM
ED

## 2020-05-29 NOTE — EKG
Test Reason : 

Blood Pressure : ***/*** mmHG

Vent. Rate : 139 BPM     Atrial Rate : 141 BPM

   P-R Int : 000 ms          QRS Dur : 064 ms

    QT Int : 346 ms       P-R-T Axes : 076 073 050 degrees

   QTc Int : 526 ms

 

Sinus tachycardia

T wave abnormality, consider inferior ischemia

Abnormal ECG

 

Confirmed by VICENTE WILSON ([id number]),  YAMILE PICKETT ([id number]) on 5/29/2020 2:27:34 PM

 

Referred By:             Confirmed By:VICENTE RICHARDSON